# Patient Record
Sex: FEMALE | Race: BLACK OR AFRICAN AMERICAN | Employment: FULL TIME | URBAN - NONMETROPOLITAN AREA
[De-identification: names, ages, dates, MRNs, and addresses within clinical notes are randomized per-mention and may not be internally consistent; named-entity substitution may affect disease eponyms.]

---

## 2024-05-31 ENCOUNTER — APPOINTMENT (OUTPATIENT)
Dept: GENERAL RADIOLOGY | Age: 29
End: 2024-05-31
Payer: MEDICAID

## 2024-05-31 ENCOUNTER — HOSPITAL ENCOUNTER (EMERGENCY)
Age: 29
Discharge: HOME | End: 2024-05-31
Attending: EMERGENCY MEDICINE
Payer: MEDICAID

## 2024-05-31 VITALS
DIASTOLIC BLOOD PRESSURE: 61 MMHG | HEART RATE: 98 BPM | SYSTOLIC BLOOD PRESSURE: 94 MMHG | RESPIRATION RATE: 25 BRPM | OXYGEN SATURATION: 99 % | TEMPERATURE: 98.9 F

## 2024-05-31 DIAGNOSIS — R07.89 ATYPICAL CHEST PAIN: Primary | ICD-10-CM

## 2024-05-31 LAB
ALBUMIN SERPL BCG-MCNC: 4 G/DL (ref 3.5–5.1)
ALP SERPL-CCNC: 56 U/L (ref 38–126)
ALT SERPL W/O P-5'-P-CCNC: 18 U/L (ref 11–66)
ANION GAP SERPL CALC-SCNC: 10 MEQ/L (ref 8–16)
AST SERPL-CCNC: 36 U/L (ref 5–40)
B-HCG SERPL QL: NEGATIVE
BASOPHILS ABSOLUTE: 0 THOU/MM3 (ref 0–0.1)
BASOPHILS NFR BLD AUTO: 0.4 %
BILIRUB SERPL-MCNC: 0.8 MG/DL (ref 0.3–1.2)
BUN SERPL-MCNC: 9 MG/DL (ref 7–22)
CALCIUM SERPL-MCNC: 9.2 MG/DL (ref 8.5–10.5)
CHLORIDE SERPL-SCNC: 104 MEQ/L (ref 98–111)
CO2 SERPL-SCNC: 25 MEQ/L (ref 23–33)
CREAT SERPL-MCNC: 0.6 MG/DL (ref 0.4–1.2)
DEPRECATED RDW RBC AUTO: 41.1 FL (ref 35–45)
EOSINOPHIL NFR BLD AUTO: 1.3 %
EOSINOPHILS ABSOLUTE: 0.1 THOU/MM3 (ref 0–0.4)
ERYTHROCYTE [DISTWIDTH] IN BLOOD BY AUTOMATED COUNT: 14.8 % (ref 11.5–14.5)
GFR SERPL CREATININE-BSD FRML MDRD: > 90 ML/MIN/1.73M2
GLUCOSE SERPL-MCNC: 103 MG/DL (ref 70–108)
HCT VFR BLD AUTO: 27.9 % (ref 37–47)
HGB BLD-MCNC: 9.4 GM/DL (ref 12–16)
IMM GRANULOCYTES # BLD AUTO: 0.01 THOU/MM3 (ref 0–0.07)
IMM GRANULOCYTES NFR BLD AUTO: 0.1 %
LIPASE SERPL-CCNC: 32.2 U/L (ref 5.6–51.3)
LYMPHOCYTES ABSOLUTE: 3 THOU/MM3 (ref 1–4.8)
LYMPHOCYTES NFR BLD AUTO: 42.9 %
MCH RBC QN AUTO: 25.5 PG (ref 26–33)
MCHC RBC AUTO-ENTMCNC: 33.7 GM/DL (ref 32.2–35.5)
MCV RBC AUTO: 75.8 FL (ref 81–99)
MONOCYTES ABSOLUTE: 0.9 THOU/MM3 (ref 0.4–1.3)
MONOCYTES NFR BLD AUTO: 13.1 %
NEUTROPHILS ABSOLUTE: 2.9 THOU/MM3 (ref 1.8–7.7)
NEUTROPHILS NFR BLD AUTO: 42.2 %
NRBC BLD AUTO-RTO: 0 /100 WBC
OSMOLALITY SERPL CALC.SUM OF ELEC: 276.5 MOSMOL/KG (ref 275–300)
PLATELET # BLD AUTO: 250 THOU/MM3 (ref 130–400)
PMV BLD AUTO: 11 FL (ref 9.4–12.4)
POTASSIUM SERPL-SCNC: 4.1 MEQ/L (ref 3.5–5.2)
PROT SERPL-MCNC: 7.6 G/DL (ref 6.1–8)
RBC # BLD AUTO: 3.68 MILL/MM3 (ref 4.2–5.4)
SODIUM SERPL-SCNC: 139 MEQ/L (ref 135–145)
TROPONIN, HIGH SENSITIVITY: < 6 NG/L (ref 0–12)
WBC # BLD AUTO: 6.9 THOU/MM3 (ref 4.8–10.8)

## 2024-05-31 PROCEDURE — 71046 X-RAY EXAM CHEST 2 VIEWS: CPT

## 2024-05-31 PROCEDURE — 93005 ELECTROCARDIOGRAM TRACING: CPT | Performed by: EMERGENCY MEDICINE

## 2024-05-31 PROCEDURE — 84703 CHORIONIC GONADOTROPIN ASSAY: CPT

## 2024-05-31 PROCEDURE — 85025 COMPLETE CBC W/AUTO DIFF WBC: CPT

## 2024-05-31 PROCEDURE — 6370000000 HC RX 637 (ALT 250 FOR IP): Performed by: EMERGENCY MEDICINE

## 2024-05-31 PROCEDURE — 36415 COLL VENOUS BLD VENIPUNCTURE: CPT

## 2024-05-31 PROCEDURE — 83690 ASSAY OF LIPASE: CPT

## 2024-05-31 PROCEDURE — 80053 COMPREHEN METABOLIC PANEL: CPT

## 2024-05-31 PROCEDURE — 99285 EMERGENCY DEPT VISIT HI MDM: CPT

## 2024-05-31 PROCEDURE — 84484 ASSAY OF TROPONIN QUANT: CPT

## 2024-05-31 RX ORDER — FAMOTIDINE 20 MG/1
20 TABLET, FILM COATED ORAL ONCE
Status: COMPLETED | OUTPATIENT
Start: 2024-05-31 | End: 2024-05-31

## 2024-05-31 RX ADMIN — FAMOTIDINE 20 MG: 20 TABLET, FILM COATED ORAL at 14:45

## 2024-05-31 ASSESSMENT — PAIN SCALES - GENERAL: PAINLEVEL_OUTOF10: 5

## 2024-05-31 ASSESSMENT — PAIN DESCRIPTION - LOCATION: LOCATION: ABDOMEN;CHEST

## 2024-05-31 NOTE — ED NOTES
Pt to ED c/o chest/abdominal pain that started this week. Pt denies vomiting but states having nausea. Pt states the pain is worse after eating. Pt denies diarrhea. VSS

## 2024-05-31 NOTE — ED PROVIDER NOTES
[facility] EMERGENCY DEPT    EMERGENCY MEDICINE      Pt Name: Jessica Sumner  MRN: [id number]  Birthdate 1995  Date of evaluation: 5/31/2024  Treating Physician: Dr. Landaverde  Resident Physician: Neto Duque MD    CHIEF COMPLAINT       Chief Complaint   Patient presents with    Chest Pain    Abdominal Pain     History obtained from chart review and the patient.    HISTORY OF PRESENT ILLNESS    HPI    Jessica Sumner is a 28 y.o. female presents to the emergency department for evaluation of left chest pain that started 5 days ago.  Stated the pain started while she was at work and did worsen when she got home and ate.  When asked about the patient's abdominal pain and where it was located she pointed to her left chest.  Patient did endorse intermittent lower left quadrant abdominal pain but it is not present at this time.  Patient is denying any shortness of breath, nausea, vomiting, urinary changes, bowel changes, swelling, fever.    The patient has no other acute complaints at this time.    PAST MEDICAL AND SURGICAL HISTORY   No past medical history on file.    No past surgical history on file.    CURRENT MEDICATIONS     There are no discharge medications for this patient.      ALLERGIES   No Known Allergies    FAMILY HISTORY   No family history on file.    SOCIAL HISTORY        PHYSICAL EXAM     ED Triage Vitals   BP Temp Temp src Pulse Resp SpO2 Height Weight   -- -- -- -- -- -- -- --     There is no height or weight on file to calculate BMI.     Initial vital signs and nursing assessment reviewed and normal.    Physical Exam  Vitals and nursing note reviewed.   Constitutional:       General: She is not in acute distress.     Appearance: Normal appearance. She is not ill-appearing, toxic-appearing or diaphoretic.   HENT:      Head: Normocephalic and atraumatic.   Eyes:      Conjunctiva/sclera: Conjunctivae normal.      Pupils: Pupils are equal, round, and reactive to light.   Cardiovascular:      Rate and

## 2024-06-01 LAB
EKG ATRIAL RATE: 88 BPM
EKG P AXIS: 34 DEGREES
EKG P-R INTERVAL: 126 MS
EKG Q-T INTERVAL: 336 MS
EKG QRS DURATION: 76 MS
EKG QTC CALCULATION (BAZETT): 406 MS
EKG R AXIS: 81 DEGREES
EKG T AXIS: 55 DEGREES
EKG VENTRICULAR RATE: 88 BPM

## 2024-06-01 PROCEDURE — 93010 ELECTROCARDIOGRAM REPORT: CPT | Performed by: INTERNAL MEDICINE

## 2024-06-11 ENCOUNTER — OFFICE VISIT (OUTPATIENT)
Dept: FAMILY MEDICINE CLINIC | Age: 29
End: 2024-06-11
Payer: MEDICAID

## 2024-06-11 VITALS
RESPIRATION RATE: 20 BRPM | OXYGEN SATURATION: 100 % | SYSTOLIC BLOOD PRESSURE: 108 MMHG | DIASTOLIC BLOOD PRESSURE: 74 MMHG | TEMPERATURE: 98.3 F | HEIGHT: 63 IN | BODY MASS INDEX: 22.15 KG/M2 | HEART RATE: 84 BPM | WEIGHT: 125 LBS

## 2024-06-11 DIAGNOSIS — D64.9 ANEMIA, UNSPECIFIED TYPE: Primary | ICD-10-CM

## 2024-06-11 DIAGNOSIS — R12 HEARTBURN: ICD-10-CM

## 2024-06-11 DIAGNOSIS — R10.13 DYSPEPSIA: ICD-10-CM

## 2024-06-11 DIAGNOSIS — K52.9 CHRONIC DIARRHEA: ICD-10-CM

## 2024-06-11 PROCEDURE — G8420 CALC BMI NORM PARAMETERS: HCPCS | Performed by: STUDENT IN AN ORGANIZED HEALTH CARE EDUCATION/TRAINING PROGRAM

## 2024-06-11 PROCEDURE — G8427 DOCREV CUR MEDS BY ELIG CLIN: HCPCS | Performed by: STUDENT IN AN ORGANIZED HEALTH CARE EDUCATION/TRAINING PROGRAM

## 2024-06-11 PROCEDURE — 99204 OFFICE O/P NEW MOD 45 MIN: CPT | Performed by: STUDENT IN AN ORGANIZED HEALTH CARE EDUCATION/TRAINING PROGRAM

## 2024-06-11 PROCEDURE — 4004F PT TOBACCO SCREEN RCVD TLK: CPT | Performed by: STUDENT IN AN ORGANIZED HEALTH CARE EDUCATION/TRAINING PROGRAM

## 2024-06-11 RX ORDER — PANTOPRAZOLE SODIUM 40 MG/1
40 TABLET, DELAYED RELEASE ORAL DAILY
Qty: 90 TABLET | Refills: 1 | Status: SHIPPED | OUTPATIENT
Start: 2024-06-11

## 2024-06-11 RX ORDER — FERROUS SULFATE 325(65) MG
325 TABLET ORAL EVERY OTHER DAY
Qty: 90 TABLET | Refills: 1 | Status: SHIPPED | OUTPATIENT
Start: 2024-06-11

## 2024-06-11 SDOH — ECONOMIC STABILITY: FOOD INSECURITY: WITHIN THE PAST 12 MONTHS, THE FOOD YOU BOUGHT JUST DIDN'T LAST AND YOU DIDN'T HAVE MONEY TO GET MORE.: NEVER TRUE

## 2024-06-11 SDOH — ECONOMIC STABILITY: FOOD INSECURITY: WITHIN THE PAST 12 MONTHS, YOU WORRIED THAT YOUR FOOD WOULD RUN OUT BEFORE YOU GOT MONEY TO BUY MORE.: NEVER TRUE

## 2024-06-11 SDOH — ECONOMIC STABILITY: HOUSING INSECURITY
IN THE LAST 12 MONTHS, WAS THERE A TIME WHEN YOU DID NOT HAVE A STEADY PLACE TO SLEEP OR SLEPT IN A SHELTER (INCLUDING NOW)?: NO

## 2024-06-11 SDOH — ECONOMIC STABILITY: INCOME INSECURITY: HOW HARD IS IT FOR YOU TO PAY FOR THE VERY BASICS LIKE FOOD, HOUSING, MEDICAL CARE, AND HEATING?: NOT HARD AT ALL

## 2024-06-11 ASSESSMENT — ENCOUNTER SYMPTOMS
SHORTNESS OF BREATH: 0
COUGH: 0
DIARRHEA: 1
ABDOMINAL PAIN: 1

## 2024-06-11 ASSESSMENT — PATIENT HEALTH QUESTIONNAIRE - PHQ9
SUM OF ALL RESPONSES TO PHQ9 QUESTIONS 1 & 2: 0
2. FEELING DOWN, DEPRESSED OR HOPELESS: NOT AT ALL
SUM OF ALL RESPONSES TO PHQ QUESTIONS 1-9: 0
1. LITTLE INTEREST OR PLEASURE IN DOING THINGS: NOT AT ALL

## 2024-06-11 NOTE — PROGRESS NOTES
[facility] PROFESSIONAL SERV[facility]  [facility]  [address]  [facility] [address]  Dept: [phone]  Dept Fax: [phone]  Loc: [phone]  Resident Note    Assessment & Plan:    1. Anemia, unspecified type    2. Heartburn    3. Dyspepsia    4. Chronic diarrhea       Orders Placed This Encounter    GI Bacterial Pathogens By PCR     Standing Status:   Future     Standing Expiration Date:   6/11/2025    Ova & Parasite Id/Count #1     Standing Status:   Future     Standing Expiration Date:   6/11/2025    CBC with Auto Differential     Standing Status:   Future     Standing Expiration Date:   10/11/2024    Iron     Standing Status:   Future     Standing Expiration Date:   6/11/2025     Order Specific Question:   Is Patient Fasting?     Answer:   Yes     Order Specific Question:   No of Hours?     Answer:   8    Ferritin     Standing Status:   Future     Standing Expiration Date:   6/11/2025    Iron Binding Capacity     Standing Status:   Future     Standing Expiration Date:   6/11/2025    IRON SATURATION     Standing Status:   Future     Standing Expiration Date:   10/11/2024    Reticulocytes     Standing Status:   Future     Standing Expiration Date:   6/11/2025    Vitamin B12 & Folate     Standing Status:   Future     Standing Expiration Date:   10/11/2024    H. Pylori Antigen, Stool     Standing Status:   Future     Standing Expiration Date:   6/11/2025    TSH With Reflex Ft4     Standing Status:   Future     Standing Expiration Date:   6/11/2025    Celiac Disease Panel     Standing Status:   Future     Standing Expiration Date:   6/11/2025    Calprotectin Stool     Standing Status:   Future     Standing Expiration Date:   6/11/2025    ferrous sulfate (IRON 325) 325 (65 Fe) MG tablet     Sig: Take 1 tablet by mouth every other day     Dispense:  90 tablet     Refill:  1    pantoprazole (PROTONIX) 40 MG tablet     Sig: Take 1 tablet by mouth daily     Dispense:  90

## 2024-06-11 NOTE — PROGRESS NOTES
Juan Carlos (Screen of Drug Use):    1) How many days in the past 12 months have you used drugs other than alcohol? 0 (positive if 7 or more)    2) How many days in the past 12 months have you used drugs more than you meant to? 0 (positive if 2 or more)

## 2024-06-11 NOTE — PATIENT INSTRUCTIONS
Where to get your stool sample and labs    1. [facility] stand-alone building with parking out front  [address] ([address], just across the street from the Emergency Dept / [address] [address])  [place] [address]  P: [phone]    Hours:  Mon - Thurs 6:00AM - 6:00PM  Fri 6 AM to 4 PM  Saturday 6:30AM - Noon

## 2024-06-22 ENCOUNTER — NURSE ONLY (OUTPATIENT)
Dept: LAB | Age: 29
End: 2024-06-22

## 2024-06-22 DIAGNOSIS — D64.9 ANEMIA, UNSPECIFIED TYPE: ICD-10-CM

## 2024-06-22 DIAGNOSIS — K52.9 CHRONIC DIARRHEA: ICD-10-CM

## 2024-06-22 DIAGNOSIS — R12 HEARTBURN: ICD-10-CM

## 2024-06-22 LAB
BASOPHILS ABSOLUTE: 0 THOU/MM3 (ref 0–0.1)
BASOPHILS NFR BLD AUTO: 0.4 %
DEPRECATED RDW RBC AUTO: 42.3 FL (ref 35–45)
EOSINOPHIL NFR BLD AUTO: 2.2 %
EOSINOPHILS ABSOLUTE: 0.1 THOU/MM3 (ref 0–0.4)
ERYTHROCYTE [DISTWIDTH] IN BLOOD BY AUTOMATED COUNT: 15.7 % (ref 11.5–14.5)
FERRITIN SERPL IA-MCNC: 17 NG/ML (ref 10–291)
FOLATE SERPL-MCNC: 8.9 NG/ML (ref 4.8–24.2)
HCT VFR BLD AUTO: 27.8 % (ref 37–47)
HGB BLD-MCNC: 9.2 GM/DL (ref 12–16)
HGB RETIC QN AUTO: 27.3 PG (ref 28.2–35.7)
IMM GRANULOCYTES # BLD AUTO: 0 THOU/MM3 (ref 0–0.07)
IMM GRANULOCYTES NFR BLD AUTO: 0 %
IMM RETICS NFR: 20.4 % (ref 3–15.9)
IRON SATN MFR SERPL: 10 % (ref 20–50)
IRON SERPL-MCNC: 39 UG/DL (ref 50–170)
LYMPHOCYTES ABSOLUTE: 2.8 THOU/MM3 (ref 1–4.8)
LYMPHOCYTES NFR BLD AUTO: 56.4 %
MCH RBC QN AUTO: 24.9 PG (ref 26–33)
MCHC RBC AUTO-ENTMCNC: 33.1 GM/DL (ref 32.2–35.5)
MCV RBC AUTO: 75.1 FL (ref 81–99)
MONOCYTES ABSOLUTE: 0.6 THOU/MM3 (ref 0.4–1.3)
MONOCYTES NFR BLD AUTO: 11.2 %
NEUTROPHILS ABSOLUTE: 1.5 THOU/MM3 (ref 1.8–7.7)
NEUTROPHILS NFR BLD AUTO: 29.8 %
NRBC BLD AUTO-RTO: 0 /100 WBC
PLATELET # BLD AUTO: 283 THOU/MM3 (ref 130–400)
PMV BLD AUTO: 10.9 FL (ref 9.4–12.4)
RBC # BLD AUTO: 3.7 MILL/MM3 (ref 4.2–5.4)
RETICS # AUTO: 57 THOU/MM3 (ref 20–115)
RETICS/RBC NFR AUTO: 1.5 % (ref 0.5–2)
TIBC SERPL-MCNC: 387 UG/DL (ref 171–450)
TSH SERPL DL<=0.005 MIU/L-ACNC: 1.26 UIU/ML (ref 0.4–4.2)
VIT B12 SERPL-MCNC: 566 PG/ML (ref 211–911)
WBC # BLD AUTO: 5 THOU/MM3 (ref 4.8–10.8)

## 2024-06-25 ENCOUNTER — OFFICE VISIT (OUTPATIENT)
Dept: FAMILY MEDICINE CLINIC | Age: 29
End: 2024-06-25

## 2024-06-25 VITALS
BODY MASS INDEX: 22.68 KG/M2 | OXYGEN SATURATION: 98 % | HEART RATE: 80 BPM | TEMPERATURE: 98.8 F | WEIGHT: 128 LBS | DIASTOLIC BLOOD PRESSURE: 76 MMHG | HEIGHT: 63 IN | RESPIRATION RATE: 16 BRPM | SYSTOLIC BLOOD PRESSURE: 104 MMHG

## 2024-06-25 DIAGNOSIS — D50.9 IRON DEFICIENCY ANEMIA, UNSPECIFIED IRON DEFICIENCY ANEMIA TYPE: Primary | ICD-10-CM

## 2024-06-25 NOTE — PROGRESS NOTES
[facility] PROFESSIONAL [facility]  [address]  [facility] [address]  Dept: [phone]  Dept Fax: [phone]  Loc: [phone]  Resident Note    Assessment & Plan:    1. Iron deficiency anemia, unspecified iron deficiency anemia type       No orders of the defined types were placed in this encounter.     Constipation 2/2 iron  - continue for iron def anemia for now, not severe.    Continue pantoprazole for GERD  - may consider GI referral in future - will need to discuss with patient in person so she is aware of the process of referrals.      Return in about 3 months (around 9/25/2024).    Future Appointments   Date Time Provider Department Center   10/11/2024  8:00 AM Caity Mejia DO [facility]       -----------------------------------------------------------------------------------------------------------    HPI    Jessica Sumner is a 28 y.o. female who presents today for:    Chief Complaint   Patient presents with    Follow-up     Patient in office today for 2 week follow up. Patient states that she has been feeling well. No chief complaints for today's visit.      Here for 2 week follow up for JESSICA and heartburn. Has started pantoprazole. Symptoms improved. No other complaints.       Review of Systems   Constitutional:  Negative for chills and fever.   Respiratory:  Negative for cough and shortness of breath.    Cardiovascular:  Negative for chest pain.   Gastrointestinal:  Negative for abdominal pain.        No results found for: \"LABA1C\", \"PQS0JLJO\"   No results found for: \"MALBCR\"   No results found for: \"CHOL\", \"TRIG\", \"HDL\", \"VLDL\", \"CHOLHDLRATIO\"   Lab Results   Component Value Date    WBC 5.0 06/22/2024    HGB 9.2 (L) 06/22/2024    HCT 27.8 (L) 06/22/2024    MCV 75.1 (L) 06/22/2024     06/22/2024    RBC 3.70 (L) 06/22/2024    MCH 24.9 (L) 06/22/2024    MCHC 33.1 06/22/2024     Lab Results   Component Value Date    IRON

## 2024-06-26 LAB
GLIADIN PEPTIDE IGA SER IA-ACNC: 0.99 FLU (ref 0–4.99)
TTG IGA SER IA-ACNC: < 1.02 FLU (ref 0–4.99)

## 2024-07-15 ENCOUNTER — TELEPHONE (OUTPATIENT)
Dept: FAMILY MEDICINE CLINIC | Age: 29
End: 2024-07-15

## 2024-07-15 DIAGNOSIS — D50.9 IRON DEFICIENCY ANEMIA, UNSPECIFIED IRON DEFICIENCY ANEMIA TYPE: Primary | ICD-10-CM

## 2024-07-15 NOTE — TELEPHONE ENCOUNTER
Low Hb noted on repeat labs -- consistent with previously diagnosed Iron Deficiency Anemia.    Recommend follow up labs (CBC and Ferritin) in 2 weeks (orders placed) and schedule office visit with me after.  Continue Iron supplementation in the mean time.    Please reschedule pt to be seen with me (PCP) in office during first week of August, not October.    Thanks!  Dr. Rodriguez   How Severe Are They?: severe Is This A New Presentation, Or A Follow-Up?: Skin Lesion

## 2024-07-16 NOTE — TELEPHONE ENCOUNTER
Called patient, started going over lab results. Patient stated to hold on a second and then started talking to another party. Had interpretor ask if she needed us to call back at another time, sounded like she could be at another appointment. Patient would not answer. I advised interpretor that we could just end the call and try again at another time.

## 2024-07-17 NOTE — TELEPHONE ENCOUNTER
Called and spoke to patient, went over result note from Dr. Rodriguez. Patient voiced understanding. Patient did reschedule appointment from October to August at 1pm.

## 2024-08-07 ENCOUNTER — LAB (OUTPATIENT)
Dept: LAB | Age: 29
End: 2024-08-07

## 2024-08-07 DIAGNOSIS — D50.9 IRON DEFICIENCY ANEMIA, UNSPECIFIED IRON DEFICIENCY ANEMIA TYPE: ICD-10-CM

## 2024-08-07 LAB
BASOPHILS ABSOLUTE: 0 THOU/MM3 (ref 0–0.1)
BASOPHILS NFR BLD AUTO: 0.3 %
DEPRECATED RDW RBC AUTO: 52.3 FL (ref 35–45)
EOSINOPHIL NFR BLD AUTO: 2.1 %
EOSINOPHILS ABSOLUTE: 0.1 THOU/MM3 (ref 0–0.4)
ERYTHROCYTE [DISTWIDTH] IN BLOOD BY AUTOMATED COUNT: 19.2 % (ref 11.5–14.5)
FERRITIN SERPL IA-MCNC: 47 NG/ML (ref 10–291)
HCT VFR BLD AUTO: 29.6 % (ref 37–47)
HGB BLD-MCNC: 9.9 GM/DL (ref 12–16)
IMM GRANULOCYTES # BLD AUTO: 0 THOU/MM3 (ref 0–0.07)
IMM GRANULOCYTES NFR BLD AUTO: 0 %
LYMPHOCYTES ABSOLUTE: 3 THOU/MM3 (ref 1–4.8)
LYMPHOCYTES NFR BLD AUTO: 48.3 %
MCH RBC QN AUTO: 25.5 PG (ref 26–33)
MCHC RBC AUTO-ENTMCNC: 33.4 GM/DL (ref 32.2–35.5)
MCV RBC AUTO: 76.3 FL (ref 81–99)
MONOCYTES ABSOLUTE: 0.8 THOU/MM3 (ref 0.4–1.3)
MONOCYTES NFR BLD AUTO: 13.3 %
NEUTROPHILS ABSOLUTE: 2.3 THOU/MM3 (ref 1.8–7.7)
NEUTROPHILS NFR BLD AUTO: 36 %
NRBC BLD AUTO-RTO: 0 /100 WBC
PLATELET # BLD AUTO: 224 THOU/MM3 (ref 130–400)
PMV BLD AUTO: 11.9 FL (ref 9.4–12.4)
RBC # BLD AUTO: 3.88 MILL/MM3 (ref 4.2–5.4)
WBC # BLD AUTO: 6.3 THOU/MM3 (ref 4.8–10.8)

## 2024-08-08 ENCOUNTER — TELEPHONE (OUTPATIENT)
Dept: FAMILY MEDICINE CLINIC | Age: 29
End: 2024-08-08

## 2024-08-08 NOTE — TELEPHONE ENCOUNTER
----- Message from Chrissy Rodriguez MD sent at 8/8/2024 10:30 AM EDT -----  Labs reviewed --  Hb stable from prior, and Ferritin improved.    Continue Iron supplementation at this time.     Will discuss further management at next scheduled appt in office on 8/20/24.

## 2024-08-20 ENCOUNTER — OFFICE VISIT (OUTPATIENT)
Dept: FAMILY MEDICINE CLINIC | Age: 29
End: 2024-08-20
Payer: COMMERCIAL

## 2024-08-20 VITALS
BODY MASS INDEX: 21.51 KG/M2 | TEMPERATURE: 98.2 F | HEART RATE: 75 BPM | OXYGEN SATURATION: 98 % | HEIGHT: 64 IN | DIASTOLIC BLOOD PRESSURE: 60 MMHG | RESPIRATION RATE: 18 BRPM | WEIGHT: 126 LBS | SYSTOLIC BLOOD PRESSURE: 112 MMHG

## 2024-08-20 DIAGNOSIS — R12 HEARTBURN: Primary | ICD-10-CM

## 2024-08-20 DIAGNOSIS — D64.9 ANEMIA, UNSPECIFIED TYPE: ICD-10-CM

## 2024-08-20 DIAGNOSIS — N76.0 ACUTE VAGINITIS: ICD-10-CM

## 2024-08-20 PROCEDURE — G8427 DOCREV CUR MEDS BY ELIG CLIN: HCPCS

## 2024-08-20 PROCEDURE — 99214 OFFICE O/P EST MOD 30 MIN: CPT

## 2024-08-20 PROCEDURE — 1036F TOBACCO NON-USER: CPT

## 2024-08-20 PROCEDURE — G8420 CALC BMI NORM PARAMETERS: HCPCS

## 2024-08-20 RX ORDER — CLOTRIMAZOLE 1 %
CREAM (GRAM) TOPICAL
Qty: 35.4 G | Refills: 1 | Status: SHIPPED | OUTPATIENT
Start: 2024-08-20 | End: 2024-08-27

## 2024-08-20 RX ORDER — FERROUS SULFATE 325(65) MG
325 TABLET ORAL EVERY OTHER DAY
Qty: 90 TABLET | Refills: 1 | Status: SHIPPED | OUTPATIENT
Start: 2024-08-20

## 2024-08-20 RX ORDER — FAMOTIDINE 40 MG/1
40 TABLET, FILM COATED ORAL EVERY EVENING
Qty: 30 TABLET | Refills: 3 | Status: SHIPPED | OUTPATIENT
Start: 2024-08-20 | End: 2024-11-18

## 2024-08-20 NOTE — PROGRESS NOTES
I saw and evaluated the patient, performing the key elements of the service.  I discussed the findings, assessment and plan with the resident and agree with the resident's findings and plan as documented in the resident's note. GC modifier added.  
(1 - 2023-24 season) Never done    Flu vaccine (1) Never done       Food Insecurity: No Food Insecurity (6/11/2024)    Hunger Vital Sign     Worried About Running Out of Food in the Last Year: Never true     Ran Out of Food in the Last Year: Never true       Assessment / Plan:   1. Anemia, unspecified type  - CBC with Auto Differential; Future  - ferrous sulfate (IRON 325) 325 (65 Fe) MG tablet; Take 1 tablet by mouth every other day  Dispense: 90 tablet; Refill: 1  - Ferritin; Future  - [facility] - Alondra Mancilla MD, Gastroenterology, [place]  Labs studies consistent with JESSICA.  Recommend continuing with Iron supplementation at this time.  Refills given.  Repeat labs (CBC and Ferritin) in 3 months and RTC in 3 months for follow up.  Referral to GI to rule out underlying etiology.    2. Heartburn  - famotidine (PEPCID) 40 MG tablet; Take 1 tablet by mouth every evening  Dispense: 30 tablet; Refill: 3    3. Acute vaginitis  - clotrimazole (LOTRIMIN AF) 1 % cream; Apply topically 2 times daily.  Dispense: 35.4 g; Refill: 1  Symptoms consistent with cute Vaginitis -- pelvic exam deferred at this time.  Apply topical cream as above.  Pt counseled on red flag symptoms and advised to seek care sooner or go to ED, if symptoms persist or worsen.   Return in about 3 months (around 11/20/2024) for f/u JESSICA/labs.    Medications Prescribed:  Orders Placed This Encounter   Medications    ferrous sulfate (IRON 325) 325 (65 Fe) MG tablet     Sig: Take 1 tablet by mouth every other day     Dispense:  90 tablet     Refill:  1    famotidine (PEPCID) 40 MG tablet     Sig: Take 1 tablet by mouth every evening     Dispense:  30 tablet     Refill:  3    clotrimazole (LOTRIMIN AF) 1 % cream     Sig: Apply topically 2 times daily.     Dispense:  35.4 g     Refill:  1       Future Appointments   Date Time Provider Department Center   11/21/2024  8:00 AM Leesa Marti DO [facility]         Patient given educational materials - see